# Patient Record
Sex: FEMALE | Race: OTHER | HISPANIC OR LATINO
[De-identification: names, ages, dates, MRNs, and addresses within clinical notes are randomized per-mention and may not be internally consistent; named-entity substitution may affect disease eponyms.]

---

## 2019-06-27 ENCOUNTER — APPOINTMENT (OUTPATIENT)
Dept: SURGERY | Facility: CLINIC | Age: 54
End: 2019-06-27
Payer: COMMERCIAL

## 2019-06-27 VITALS
WEIGHT: 155 LBS | HEIGHT: 61 IN | BODY MASS INDEX: 29.27 KG/M2 | DIASTOLIC BLOOD PRESSURE: 74 MMHG | SYSTOLIC BLOOD PRESSURE: 118 MMHG | HEART RATE: 86 BPM

## 2019-06-27 DIAGNOSIS — Z72.3 LACK OF PHYSICAL EXERCISE: ICD-10-CM

## 2019-06-27 DIAGNOSIS — Z86.018 PERSONAL HISTORY OF OTHER BENIGN NEOPLASM: ICD-10-CM

## 2019-06-27 DIAGNOSIS — N63.20 UNSPECIFIED LUMP IN THE LEFT BREAST, UNSPECIFIED QUADRANT: ICD-10-CM

## 2019-06-27 DIAGNOSIS — Z80.41 FAMILY HISTORY OF MALIGNANT NEOPLASM OF OVARY: ICD-10-CM

## 2019-06-27 PROCEDURE — 99203 OFFICE O/P NEW LOW 30 MIN: CPT

## 2019-06-27 RX ORDER — NORETHINDRONE ACETATE AND ETHINYL ESTRADIOL, ETHINYL ESTRADIOL AND FERROUS FUMARATE 1MG-10(24)
1 MG-10 MCG / KIT ORAL
Refills: 0 | Status: ACTIVE | COMMUNITY

## 2019-07-01 NOTE — HISTORY OF PRESENT ILLNESS
[FreeTextEntry1] : 53 year old female referred by Dr. Fiona Moe (OB/GYN) presents for consultation. She complains of a left breast lump first noticed about 1.5 months ago; it is a/w intermittent aching in the left breast, she does not feel as though it is a/w her menses. She reports having completed left breast biopsies in the past, which are reported to be benign (the last one was 6 years ago)\par \par On 10/24/18 she underwent her screening mammo-jerzy results showing scattered areas of fibroglandular density, no mammographic evidence of malignancy, no change from prior exam. BI-RADS 1 negative results. \par \par Her family history is significant for ovarian cancer, her mother was diagnosed around age 37, and an m-aunt (the patients great aunt) was diagnosed around age 58. Her grandfather and p-aunt both had leukemia. Her m-grandmother had lymphoma. There is not family history of breast cancer. She has not completed genetic testing.

## 2019-07-01 NOTE — REVIEW OF SYSTEMS
[As Noted in HPI] : as noted in HPI [Sore Throat] : sore throat [Negative] : Heme/Lymph [FreeTextEntry9] : Muscle pain

## 2019-07-01 NOTE — PAST MEDICAL HISTORY
[Menarche Age ____] : age at menarche was [unfilled] [Approximately ___] : the LMP was approximately [unfilled] [Irregular Cycle Intervals] : are  irregular [Total Preg ___] : G[unfilled] [Live Births ___] : P[unfilled]  [Living ___] : Living: [unfilled] [Age At Live Birth ___] : Age at live birth: [unfilled] [Normal Amount/Duration] : it was of a normal amount and duration [Missed Most Recent Period] : missing most recent period [History of Hormone Replacement Treatment] : has no history of hormone replacement treatment [FreeTextEntry4] : Every 3-4 months [FreeTextEntry5] : - \par - 1998 [FreeTextEntry6] : None [FreeTextEntry7] : Oral contraceptive pills- Lo-loestrin FE  x5 years\par \par  [FreeTextEntry8] :  first child for 2 months

## 2019-07-12 ENCOUNTER — APPOINTMENT (OUTPATIENT)
Dept: ULTRASOUND IMAGING | Facility: HOSPITAL | Age: 54
End: 2019-07-12

## 2019-07-12 ENCOUNTER — OUTPATIENT (OUTPATIENT)
Dept: OUTPATIENT SERVICES | Facility: HOSPITAL | Age: 54
LOS: 1 days | End: 2019-07-12
Payer: COMMERCIAL

## 2019-07-12 PROCEDURE — 76641 ULTRASOUND BREAST COMPLETE: CPT | Mod: 26,LT

## 2019-07-12 PROCEDURE — 76641 ULTRASOUND BREAST COMPLETE: CPT

## 2019-07-16 ENCOUNTER — CHART COPY (OUTPATIENT)
Age: 54
End: 2019-07-16

## 2019-08-19 ENCOUNTER — OUTPATIENT (OUTPATIENT)
Dept: OUTPATIENT SERVICES | Facility: HOSPITAL | Age: 54
LOS: 1 days | End: 2019-08-19
Payer: COMMERCIAL

## 2019-08-19 ENCOUNTER — APPOINTMENT (OUTPATIENT)
Dept: MAMMOGRAPHY | Facility: HOSPITAL | Age: 54
End: 2019-08-19
Payer: COMMERCIAL

## 2019-08-19 PROCEDURE — 77065 DX MAMMO INCL CAD UNI: CPT

## 2019-08-19 PROCEDURE — 77061 BREAST TOMOSYNTHESIS UNI: CPT | Mod: 26

## 2019-08-19 PROCEDURE — G0279: CPT

## 2019-08-19 PROCEDURE — 77065 DX MAMMO INCL CAD UNI: CPT | Mod: 26,LT

## 2019-09-16 ENCOUNTER — CHART COPY (OUTPATIENT)
Age: 54
End: 2019-09-16